# Patient Record
Sex: FEMALE | Race: WHITE | NOT HISPANIC OR LATINO | Employment: UNEMPLOYED | URBAN - METROPOLITAN AREA
[De-identification: names, ages, dates, MRNs, and addresses within clinical notes are randomized per-mention and may not be internally consistent; named-entity substitution may affect disease eponyms.]

---

## 2017-07-03 ENCOUNTER — ALLSCRIPTS OFFICE VISIT (OUTPATIENT)
Dept: OTHER | Facility: OTHER | Age: 22
End: 2017-07-03

## 2017-07-03 DIAGNOSIS — F19.20 OTHER PSYCHOACTIVE SUBSTANCE DEPENDENCE, UNCOMPLICATED (HCC): ICD-10-CM

## 2017-07-05 ENCOUNTER — APPOINTMENT (OUTPATIENT)
Dept: LAB | Facility: HOSPITAL | Age: 22
End: 2017-07-05
Payer: COMMERCIAL

## 2017-07-05 DIAGNOSIS — F19.20 OTHER PSYCHOACTIVE SUBSTANCE DEPENDENCE, UNCOMPLICATED (HCC): ICD-10-CM

## 2017-07-05 PROCEDURE — 80307 DRUG TEST PRSMV CHEM ANLYZR: CPT

## 2017-07-10 ENCOUNTER — ALLSCRIPTS OFFICE VISIT (OUTPATIENT)
Dept: OTHER | Facility: OTHER | Age: 22
End: 2017-07-10

## 2017-07-10 LAB
BUPRENORPHINE UR CFM-MCNC: 48 NG/ML
BUPRENORPHINE UR QL CFM: NORMAL NG/ML
BUPRENORPHINE UR QL CFM: POSITIVE
BUPRENORPHINE+NOR UR QL: POSITIVE
NORBUPRENORPHINE UR CFM-MCNC: 136 NG/ML
NORBUPRENORPHINE UR QL CFM: POSITIVE

## 2017-07-11 ENCOUNTER — APPOINTMENT (OUTPATIENT)
Dept: LAB | Facility: HOSPITAL | Age: 22
End: 2017-07-11
Payer: COMMERCIAL

## 2017-07-11 DIAGNOSIS — F19.20 OTHER PSYCHOACTIVE SUBSTANCE DEPENDENCE, UNCOMPLICATED (HCC): ICD-10-CM

## 2017-07-13 LAB
AMPHETAMINES UR QL SCN: NEGATIVE NG/ML
BARBITURATES UR QL SCN: NEGATIVE NG/ML
BENZODIAZ UR QL SCN: NEGATIVE
BZE UR QL SCN: POSITIVE
CANNABINOIDS UR QL SCN: POSITIVE
METHADONE UR QL SCN: NEGATIVE NG/ML
OPIATES UR QL: POSITIVE
PCP UR QL: NEGATIVE NG/ML
PROPOXYPH UR QL: NEGATIVE NG/ML

## 2017-07-20 LAB
BUPRENORPHINE UR CFM-MCNC: 278 NG/ML
BUPRENORPHINE UR QL CFM: NORMAL NG/ML
BUPRENORPHINE UR QL CFM: POSITIVE
BUPRENORPHINE+NOR UR QL: POSITIVE
NORBUPRENORPHINE UR CFM-MCNC: 1060 NG/ML
NORBUPRENORPHINE UR QL CFM: POSITIVE

## 2017-07-24 LAB
AMPHETAMINES UR QL SCN: POSITIVE
BARBITURATES UR QL SCN: NEGATIVE NG/ML
BENZODIAZ UR QL SCN: NEGATIVE NG/ML
BZE UR QL SCN: NEGATIVE NG/ML
CANNABINOIDS UR QL SCN: POSITIVE
METHADONE UR QL SCN: NEGATIVE NG/ML
OPIATES UR QL: POSITIVE
PCP UR QL: NEGATIVE NG/ML
PROPOXYPH UR QL: NEGATIVE NG/ML

## 2017-08-01 LAB — AMPHETAMINES UR QL SCN: NORMAL

## 2017-08-07 ENCOUNTER — ALLSCRIPTS OFFICE VISIT (OUTPATIENT)
Dept: OTHER | Facility: OTHER | Age: 22
End: 2017-08-07

## 2017-08-18 ENCOUNTER — OFFICE VISIT (OUTPATIENT)
Dept: URGENT CARE | Facility: CLINIC | Age: 22
End: 2017-08-18
Payer: COMMERCIAL

## 2017-08-18 PROCEDURE — 99203 OFFICE O/P NEW LOW 30 MIN: CPT

## 2018-01-12 VITALS
BODY MASS INDEX: 19.46 KG/M2 | HEIGHT: 64 IN | SYSTOLIC BLOOD PRESSURE: 98 MMHG | WEIGHT: 114 LBS | RESPIRATION RATE: 16 BRPM | DIASTOLIC BLOOD PRESSURE: 60 MMHG | HEART RATE: 84 BPM | TEMPERATURE: 98.3 F

## 2018-01-13 VITALS
HEIGHT: 64 IN | DIASTOLIC BLOOD PRESSURE: 60 MMHG | RESPIRATION RATE: 16 BRPM | WEIGHT: 116 LBS | HEART RATE: 68 BPM | BODY MASS INDEX: 19.81 KG/M2 | SYSTOLIC BLOOD PRESSURE: 100 MMHG

## 2018-01-13 NOTE — MISCELLANEOUS
Provider Comments  Provider Comments:   NO SHOW APPT      Signatures   Electronically signed by : Lor Tavera, ; Aug  7 2017  3:05PM EST                       (Author)

## 2018-11-06 ENCOUNTER — OFFICE VISIT (OUTPATIENT)
Dept: URGENT CARE | Facility: CLINIC | Age: 23
End: 2018-11-06
Payer: COMMERCIAL

## 2018-11-06 VITALS
BODY MASS INDEX: 24.75 KG/M2 | SYSTOLIC BLOOD PRESSURE: 110 MMHG | RESPIRATION RATE: 18 BRPM | TEMPERATURE: 97.7 F | OXYGEN SATURATION: 96 % | DIASTOLIC BLOOD PRESSURE: 71 MMHG | HEIGHT: 64 IN | HEART RATE: 80 BPM | WEIGHT: 145 LBS

## 2018-11-06 DIAGNOSIS — R11.0 NAUSEA: Primary | ICD-10-CM

## 2018-11-06 PROCEDURE — 99213 OFFICE O/P EST LOW 20 MIN: CPT | Performed by: FAMILY MEDICINE

## 2018-11-06 RX ORDER — QUETIAPINE FUMARATE 200 MG/1
25 TABLET, FILM COATED ORAL
COMMUNITY

## 2018-11-06 RX ORDER — BUPRENORPHINE HYDROCHLORIDE AND NALOXONE HYDROCHLORIDE DIHYDRATE 8; 2 MG/1; MG/1
1 TABLET SUBLINGUAL DAILY
COMMUNITY
End: 2022-04-13

## 2018-11-06 RX ORDER — ONDANSETRON 4 MG/1
4 TABLET, ORALLY DISINTEGRATING ORAL EVERY 6 HOURS PRN
Qty: 12 TABLET | Refills: 0 | Status: SHIPPED | OUTPATIENT
Start: 2018-11-06

## 2018-11-06 RX ORDER — ONDANSETRON 4 MG/1
4 TABLET, ORALLY DISINTEGRATING ORAL ONCE
Status: COMPLETED | OUTPATIENT
Start: 2018-11-06 | End: 2018-11-06

## 2018-11-06 RX ORDER — GABAPENTIN 300 MG/1
100 CAPSULE ORAL 3 TIMES DAILY
COMMUNITY

## 2018-11-06 RX ADMIN — ONDANSETRON 4 MG: 4 TABLET, ORALLY DISINTEGRATING ORAL at 18:39

## 2018-11-06 NOTE — PROGRESS NOTES
[facility]        NAME: Rogerio Marquez is a 24 y o  female  : 1995    MRN: [id number]  DATE: 2018  TIME: 7:01 PM    Assessment and Plan   Nausea [R11 0]  1  Nausea  ondansetron (ZOFRAN-ODT) dispersible tablet 4 mg    ondansetron (ZOFRAN-ODT) 4 mg disintegrating tablet         Patient Instructions     For completeness sake, and because you were unable to urinate here in the office, I would recommend following up with a home urine pregnancy test   Nausea and vomiting could just be due to the rehab process/lack of Seroquel  I would recommend restarting the Seroquel  If no improvement in the next day or 2 follow up with the emergency room for further evaluation  Follow up with PCP in 3-5 days  Proceed to  ER if symptoms worsen  Chief Complaint     Chief Complaint   Patient presents with    Vomiting     vomiting for 3-4 days, denies belly pain         History of Present Illness       Patient just got out of rehab in the past week  She has been without her Seroquel since that time, and was just sent to the pharmacy today  She did start with nausea and vomiting 3 days ago  Not tolerating any solids, she is tolerating water, to some degree  She tried Gatorade earlier today and it did make her vomit  A psychiatrist does not think that being without her Seroquel could be causing the nausea and vomiting  Patient denies any chance that she could be pregnant  Vomiting    Pertinent negatives include no chills or fever  Review of Systems   Review of Systems   Constitutional: Positive for appetite change  Negative for chills and fever  HENT: Negative  Respiratory: Negative  Cardiovascular: Negative  Gastrointestinal: Positive for vomiting           Current Medications       Current Outpatient Prescriptions:     buprenorphine-naloxone (SUBOXONE) 8-2 mg per SL tablet, Place 1 tablet under the tongue daily, Disp: , Rfl:     gabapentin (NEURONTIN) 300 mg capsule, Take 100 mg by mouth 3 (three) times a day, Disp: , Rfl:     QUEtiapine (SEROquel) 200 mg tablet, Take 25 mg by mouth daily at bedtime, Disp: , Rfl:     ondansetron (ZOFRAN-ODT) 4 mg disintegrating tablet, Take 1 tablet (4 mg total) by mouth every 6 (six) hours as needed for nausea or vomiting, Disp: 12 tablet, Rfl: 0  No current facility-administered medications for this visit  Current Allergies     Allergies as of 11/06/2018    (No Known Allergies)            The following portions of the patient's history were reviewed and updated as appropriate: allergies, current medications, past family history, past medical history, past social history, past surgical history and problem list      Past Medical History:   Diagnosis Date    Addiction to drug ([facility] [address] )     Anxiety        History reviewed  No pertinent surgical history  History reviewed  No pertinent family history  Medications have been verified  Objective   /71   Pulse 80   Temp 97 7 °F (36 5 °C) (Tympanic)   Resp 18   Ht 5' 4" (1 626 m)   Wt 65 8 kg (145 lb)   LMP 11/03/2018   SpO2 96%   BMI 24 89 kg/m²        Physical Exam     Physical Exam   Constitutional: She is oriented to person, place, and time  She appears well-developed and well-nourished  HENT:   Right Ear: External ear normal    Left Ear: External ear normal    Nose: Nose normal    Mouth/Throat: Oropharynx is clear and moist  No oropharyngeal exudate  Eyes: Conjunctivae are normal    Neck: Normal range of motion  Neck supple  Cardiovascular: Normal rate, regular rhythm and normal heart sounds  No murmur heard  Pulmonary/Chest: Effort normal and breath sounds normal  No respiratory distress  She has no wheezes  She has no rales  She exhibits no tenderness  Abdominal: Soft  Bowel sounds are normal  There is no tenderness  Musculoskeletal: Normal range of motion  Lymphadenopathy:     She has no cervical adenopathy     Neurological: She is alert and oriented to person, place, and time  Skin: Skin is warm  No rash noted  No erythema

## 2018-11-07 NOTE — PATIENT INSTRUCTIONS
For completeness sake, and because you were unable to urinate here in the office, I would recommend following up with a home urine pregnancy test   Nausea and vomiting could just be due to the rehab process/lack of Seroquel  I would recommend restarting the Seroquel  If no improvement in the next day or 2 follow up with the emergency room for further evaluation  Follow up with PCP in 3-5 days  Proceed to  ER if symptoms worsen  Acute Nausea and Vomiting   AMBULATORY CARE:   Acute nausea and vomiting  starts suddenly, gets worse quickly, and lasts a short time  Common causes include pregnancy, alcohol, infection, and medicines  A head injury, heart attack, or inner ear imbalance can also cause acute nausea and vomiting  Seek care immediately if:   · You see blood in your vomit or your bowel movements  · You have sudden, severe pain in your chest and upper abdomen after hard vomiting or retching  · You have swelling in your neck and chest      · You are dizzy, cold, and thirsty and your eyes and mouth are dry  · You are urinating very little or not at all  · You have muscle weakness, leg cramps, and trouble breathing  · Your heart is beating much faster than normal      · You continue to vomit for more than 48 hours  Contact your healthcare provider if:   · You have frequent dry heaves (vomiting but nothing comes out)  · Your nausea and vomiting does not get better or go away after you use medicine  · You have questions or concerns about your condition or treatment  Treatment for acute nausea and vomiting  may include medicines to calm your stomach and stop the vomiting  You may need IV fluids if you are dehydrated  Prevent or manage acute nausea and vomiting:   · Do not drink alcohol  Alcohol may upset or irritate your stomach  Too much alcohol can also cause acute nausea and vomiting  · Control stress  Headaches due to stress may cause nausea and vomiting   Find ways to relax and manage your stress  Get more rest and sleep  · Drink more liquids as directed  Vomiting can lead to dehydration  It is important to drink more liquids to help replace lost body fluids  Ask your healthcare provider how much liquid to drink each day and which liquids are best for you  Your provider may recommend that you drink an oral rehydration solution (ORS)  ORS contains water, salts, and sugar that are needed to replace the lost body fluids  Ask what kind of ORS to use, how much to drink, and where to get it  · Eat smaller meals, more often  Eat small amounts of food every 2 to 3 hours, even if you are not hungry  Food in your stomach may decrease your nausea  · Talk to your healthcare provider before you take over-the-counter (OTC) medicines  These medicines can cause serious problems if you use certain other medicines, or you have a medical condition  You may have problems if you use too much or use them for longer than the label says  Follow directions on the label carefully  Follow up with your healthcare provider as directed:  Write down your questions so you remember to ask them during your visits  © 2017 [address] [employer] Information is for End User's use only and may not be sold, redistributed or otherwise used for commercial purposes  All illustrations and images included in CareNotes® are the copyrighted property of [employer]  or [employer]  The above information is an  only  It is not intended as medical advice for individual conditions or treatments  Talk to your doctor, nurse or pharmacist before following any medical regimen to see if it is safe and effective for you

## 2020-11-23 ENCOUNTER — APPOINTMENT (EMERGENCY)
Dept: CT IMAGING | Facility: HOSPITAL | Age: 25
End: 2020-11-23
Payer: COMMERCIAL

## 2020-11-23 ENCOUNTER — HOSPITAL ENCOUNTER (EMERGENCY)
Facility: HOSPITAL | Age: 25
Discharge: HOME | End: 2020-11-23
Attending: EMERGENCY MEDICINE | Admitting: EMERGENCY MEDICINE
Payer: COMMERCIAL

## 2020-11-23 VITALS
TEMPERATURE: 98.3 F | OXYGEN SATURATION: 99 % | RESPIRATION RATE: 16 BRPM | HEART RATE: 91 BPM | DIASTOLIC BLOOD PRESSURE: 53 MMHG | SYSTOLIC BLOOD PRESSURE: 106 MMHG

## 2020-11-23 DIAGNOSIS — N94.9 ADNEXAL CYST: ICD-10-CM

## 2020-11-23 DIAGNOSIS — N12 PYELONEPHRITIS: Primary | ICD-10-CM

## 2020-11-23 LAB
ALBUMIN SERPL BCP-MCNC: 3.6 G/DL (ref 3.5–5.7)
ALP SERPL-CCNC: 56 U/L (ref 40–150)
ALT SERPL W P-5'-P-CCNC: 6 U/L (ref 7–52)
ANION GAP SERPL CALCULATED.3IONS-SCNC: 10 MMOL/L (ref 4–13)
AST SERPL W P-5'-P-CCNC: 13 U/L (ref 13–39)
BACTERIA UR QL AUTO: ABNORMAL /HPF
BASOPHILS # BLD AUTO: 0.1 THOUSANDS/ΜL (ref 0–0.1)
BASOPHILS NFR BLD AUTO: 0 % (ref 0–2)
BILIRUB SERPL-MCNC: 0.6 MG/DL (ref 0.2–1)
BILIRUB UR QL STRIP: NEGATIVE
BUN SERPL-MCNC: 14 MG/DL (ref 7–25)
CALCIUM SERPL-MCNC: 8.2 MG/DL (ref 8.6–10.5)
CHLORIDE SERPL-SCNC: 102 MMOL/L (ref 98–107)
CLARITY UR: ABNORMAL
CO2 SERPL-SCNC: 24 MMOL/L (ref 21–31)
COLOR UR: YELLOW
CREAT SERPL-MCNC: 0.79 MG/DL (ref 0.6–1.2)
EOSINOPHIL # BLD AUTO: 0 THOUSAND/ΜL (ref 0–0.61)
EOSINOPHIL NFR BLD AUTO: 0 % (ref 0–5)
ERYTHROCYTE [DISTWIDTH] IN BLOOD BY AUTOMATED COUNT: 13.5 % (ref 11.5–14.5)
EXT PREG TEST URINE: NEGATIVE
EXT. CONTROL ED NAV: NORMAL
GFR SERPL CREATININE-BSD FRML MDRD: 104 ML/MIN/1.73SQ M
GLUCOSE SERPL-MCNC: 103 MG/DL (ref 65–99)
GLUCOSE UR STRIP-MCNC: NEGATIVE MG/DL
HCT VFR BLD AUTO: 43.8 % (ref 42–47)
HGB BLD-MCNC: 14.5 G/DL (ref 12–16)
HGB UR QL STRIP.AUTO: ABNORMAL
KETONES UR STRIP-MCNC: ABNORMAL MG/DL
LACTATE SERPL-SCNC: 0.6 MMOL/L (ref 0.5–2)
LEUKOCYTE ESTERASE UR QL STRIP: ABNORMAL
LIPASE SERPL-CCNC: <10 U/L (ref 11–82)
LYMPHOCYTES # BLD AUTO: 0.7 THOUSANDS/ΜL (ref 0.6–4.47)
LYMPHOCYTES NFR BLD AUTO: 4 % (ref 21–51)
MCH RBC QN AUTO: 33.8 PG (ref 26–34)
MCHC RBC AUTO-ENTMCNC: 33.1 G/DL (ref 31–37)
MCV RBC AUTO: 102 FL (ref 81–99)
MONOCYTES # BLD AUTO: 1.8 THOUSAND/ΜL (ref 0.17–1.22)
MONOCYTES NFR BLD AUTO: 10 % (ref 2–12)
NEUTROPHILS # BLD AUTO: 15.9 THOUSANDS/ΜL (ref 1.4–6.5)
NEUTS SEG NFR BLD AUTO: 86 % (ref 42–75)
NITRITE UR QL STRIP: POSITIVE
NON-SQ EPI CELLS URNS QL MICRO: ABNORMAL /HPF
PH UR STRIP.AUTO: 6 [PH]
PLATELET # BLD AUTO: 247 THOUSANDS/UL (ref 149–390)
PLATELET BLD QL SMEAR: ADEQUATE
PLATELET CLUMP BLD QL SMEAR: NORMAL
PMV BLD AUTO: 9.5 FL (ref 8.6–11.7)
POTASSIUM SERPL-SCNC: 3.8 MMOL/L (ref 3.5–5.5)
PROT SERPL-MCNC: 6.3 G/DL (ref 6.4–8.9)
PROT UR STRIP-MCNC: ABNORMAL MG/DL
RBC # BLD AUTO: 4.28 MILLION/UL (ref 3.9–5.2)
RBC #/AREA URNS AUTO: ABNORMAL /HPF
RBC MORPH BLD: NORMAL
SODIUM SERPL-SCNC: 136 MMOL/L (ref 134–143)
SP GR UR STRIP.AUTO: 1.02 (ref 1–1.03)
UROBILINOGEN UR QL STRIP.AUTO: 0.2 E.U./DL
WBC # BLD AUTO: 18.5 THOUSAND/UL (ref 4.8–10.8)
WBC #/AREA URNS AUTO: ABNORMAL /HPF

## 2020-11-23 PROCEDURE — 87086 URINE CULTURE/COLONY COUNT: CPT | Performed by: EMERGENCY MEDICINE

## 2020-11-23 PROCEDURE — 83605 ASSAY OF LACTIC ACID: CPT | Performed by: PHYSICIAN ASSISTANT

## 2020-11-23 PROCEDURE — 85025 COMPLETE CBC W/AUTO DIFF WBC: CPT | Performed by: PHYSICIAN ASSISTANT

## 2020-11-23 PROCEDURE — G1004 CDSM NDSC: HCPCS

## 2020-11-23 PROCEDURE — 96375 TX/PRO/DX INJ NEW DRUG ADDON: CPT

## 2020-11-23 PROCEDURE — 74176 CT ABD & PELVIS W/O CONTRAST: CPT

## 2020-11-23 PROCEDURE — 99284 EMERGENCY DEPT VISIT MOD MDM: CPT

## 2020-11-23 PROCEDURE — 83690 ASSAY OF LIPASE: CPT | Performed by: PHYSICIAN ASSISTANT

## 2020-11-23 PROCEDURE — 81001 URINALYSIS AUTO W/SCOPE: CPT | Performed by: EMERGENCY MEDICINE

## 2020-11-23 PROCEDURE — 87186 SC STD MICRODIL/AGAR DIL: CPT | Performed by: EMERGENCY MEDICINE

## 2020-11-23 PROCEDURE — 96365 THER/PROPH/DIAG IV INF INIT: CPT

## 2020-11-23 PROCEDURE — 80053 COMPREHEN METABOLIC PANEL: CPT | Performed by: PHYSICIAN ASSISTANT

## 2020-11-23 PROCEDURE — 81025 URINE PREGNANCY TEST: CPT | Performed by: EMERGENCY MEDICINE

## 2020-11-23 PROCEDURE — 87040 BLOOD CULTURE FOR BACTERIA: CPT | Performed by: PHYSICIAN ASSISTANT

## 2020-11-23 PROCEDURE — 36415 COLL VENOUS BLD VENIPUNCTURE: CPT | Performed by: PHYSICIAN ASSISTANT

## 2020-11-23 PROCEDURE — 87077 CULTURE AEROBIC IDENTIFY: CPT | Performed by: EMERGENCY MEDICINE

## 2020-11-23 PROCEDURE — 99284 EMERGENCY DEPT VISIT MOD MDM: CPT | Performed by: PHYSICIAN ASSISTANT

## 2020-11-23 RX ORDER — SULFAMETHOXAZOLE AND TRIMETHOPRIM 800; 160 MG/1; MG/1
1 TABLET ORAL 2 TIMES DAILY
Qty: 14 TABLET | Refills: 0 | Status: SHIPPED | OUTPATIENT
Start: 2020-11-23 | End: 2020-11-23 | Stop reason: SDUPTHER

## 2020-11-23 RX ORDER — KETOROLAC TROMETHAMINE 30 MG/ML
30 INJECTION, SOLUTION INTRAMUSCULAR; INTRAVENOUS ONCE
Status: COMPLETED | OUTPATIENT
Start: 2020-11-23 | End: 2020-11-23

## 2020-11-23 RX ORDER — CEFTRIAXONE 1 G/50ML
1000 INJECTION, SOLUTION INTRAVENOUS ONCE
Status: COMPLETED | OUTPATIENT
Start: 2020-11-23 | End: 2020-11-23

## 2020-11-23 RX ORDER — SULFAMETHOXAZOLE AND TRIMETHOPRIM 800; 160 MG/1; MG/1
1 TABLET ORAL 2 TIMES DAILY
Qty: 28 TABLET | Refills: 0 | Status: SHIPPED | OUTPATIENT
Start: 2020-11-23 | End: 2020-12-07

## 2020-11-23 RX ADMIN — CEFTRIAXONE 1000 MG: 1 INJECTION, SOLUTION INTRAVENOUS at 14:34

## 2020-11-23 RX ADMIN — SODIUM CHLORIDE 1000 ML: 0.9 INJECTION, SOLUTION INTRAVENOUS at 14:33

## 2020-11-23 RX ADMIN — KETOROLAC TROMETHAMINE 30 MG: 30 INJECTION, SOLUTION INTRAMUSCULAR at 14:33

## 2020-11-25 LAB — BACTERIA UR CULT: ABNORMAL

## 2020-11-25 RX ORDER — LEVOFLOXACIN 750 MG/1
750 TABLET ORAL DAILY
Qty: 5 TABLET | Refills: 0 | Status: SHIPPED | OUTPATIENT
Start: 2020-11-25 | End: 2020-11-30

## 2020-11-29 LAB
BACTERIA BLD CULT: NORMAL
BACTERIA BLD CULT: NORMAL

## 2022-04-13 ENCOUNTER — OFFICE VISIT (OUTPATIENT)
Dept: INTERNAL MEDICINE CLINIC | Facility: CLINIC | Age: 27
End: 2022-04-13

## 2022-04-13 VITALS
DIASTOLIC BLOOD PRESSURE: 64 MMHG | BODY MASS INDEX: 19.72 KG/M2 | OXYGEN SATURATION: 98 % | TEMPERATURE: 97.2 F | WEIGHT: 115.5 LBS | HEART RATE: 76 BPM | SYSTOLIC BLOOD PRESSURE: 108 MMHG | HEIGHT: 64 IN

## 2022-04-13 DIAGNOSIS — F10.10 ALCOHOL ABUSE: ICD-10-CM

## 2022-04-13 DIAGNOSIS — Z79.899 ENCOUNTER FOR MONITORING SUBOXONE MAINTENANCE THERAPY: ICD-10-CM

## 2022-04-13 DIAGNOSIS — Z79.899 MEDICATION THERAPY CONTINUED: ICD-10-CM

## 2022-04-13 DIAGNOSIS — Z51.81 ENCOUNTER FOR MONITORING SUBOXONE MAINTENANCE THERAPY: ICD-10-CM

## 2022-04-13 DIAGNOSIS — F11.20 HEROIN USE DISORDER, MODERATE, DEPENDENCE (HCC): Primary | ICD-10-CM

## 2022-04-13 PROBLEM — G47.00 INSOMNIA: Status: ACTIVE | Noted: 2017-07-03

## 2022-04-13 PROBLEM — F41.9 ANXIETY: Status: ACTIVE | Noted: 2017-07-03

## 2022-04-13 PROBLEM — F17.200 TOBACCO USE DISORDER: Status: ACTIVE | Noted: 2017-07-03

## 2022-04-13 PROBLEM — Z72.0 TOBACCO ABUSE: Status: ACTIVE | Noted: 2017-07-03

## 2022-04-13 PROCEDURE — 99203 OFFICE O/P NEW LOW 30 MIN: CPT | Performed by: INTERNAL MEDICINE

## 2022-04-13 PROCEDURE — 80307 DRUG TEST PRSMV CHEM ANLYZR: CPT | Performed by: INTERNAL MEDICINE

## 2022-04-13 RX ORDER — BUPRENORPHINE AND NALOXONE 8; 2 MG/1; MG/1
FILM, SOLUBLE BUCCAL; SUBLINGUAL
Qty: 14 FILM | Refills: 0 | Status: CANCELLED | OUTPATIENT
Start: 2022-04-13

## 2022-04-13 RX ORDER — BUPRENORPHINE AND NALOXONE 8; 2 MG/1; MG/1
FILM, SOLUBLE BUCCAL; SUBLINGUAL
Qty: 14 FILM | Refills: 0 | Status: SHIPPED | OUTPATIENT
Start: 2022-04-13

## 2022-04-13 RX ORDER — NALOXONE HYDROCHLORIDE 4 MG/.1ML
1 SPRAY NASAL AS NEEDED
Qty: 1 EACH | Refills: 1 | Status: SHIPPED | OUTPATIENT
Start: 2022-04-13

## 2022-04-13 NOTE — PATIENT INSTRUCTIONS
Naloxone (Into the nose)   Naloxone Hydrochloride (nal-OX-one hernandez-droe-KLOR-chencho)  Treats opioid overdose in an emergency situation  Must be given as soon as possible  Brand Name(s): Kloxxado, Narcan   There may be other brand names for this medicine  When This Medicine Should Not Be Used: This medicine is not right for everyone  Do not use it if you had an allergic reaction to naloxone  How to Use This Medicine:   Spray  · Your doctor will tell you how much medicine to use  Do not use more than directed  · Your doctor or a pharmacist can tell you where to buy this medicine  It is available without a doctor's order at most major pharmacies  · This medicine must be given to you (the patient) by someone else  Talk with people close to you so they know what to do in case of an emergency  · Read and follow the patient instructions that come with this medicine  Talk to your doctor or pharmacist if you have any questions  · This medicine is for use only in the nose  Do not get any of it in your eyes or on your skin  If it does get on these areas, rinse it off right away  · To use:   ? Each nasal spray contains only one dose of naloxone  Do not prime or test the nasal spray  ? Hold the nasal spray with your thumb on the bottom of the plunger and your first and middle fingers on either side of the nozzle  ? Christine Bench the patient on his or her back  Support the patient's neck with your hand and let the head tilt back  ? Gently insert the tip of the nozzle into one nostril, until your fingers on either side of the nozzle are against the bottom of the patient's nose  ? Press the plunger firmly to give the dose  Remove the nasal spray from the patient's nose  ? Move the patient on his or her side (recovery position)  Get emergency medical help right away  ? Watch the patient closely  If needed, you may give more doses every 2 to 3 minutes until the patient responds   Use a new nasal spray for each dose and spray the medicine into the other nostril each time  · Store the medicine in a closed container at room temperature, away from heat, moisture, and direct light  Do not freeze or expose to excessive heat  If the spray is frozen and is needed in an emergency, do not wait for the spray to thaw  Get medical help right away  However, the spray may be thawed for 15 minutes in room temperature, and it can still be used if it has been thawed after being frozen before  · Ask your pharmacist about the best way to dispose of medicine that you do not use  Drugs and Foods to Avoid:      Ask your doctor or pharmacist before using any other medicine, including over-the-counter medicines, vitamins, and herbal products  Warnings While Using This Medicine:   · Tell your doctor if you are pregnant or breastfeeding, or if you have heart or blood vessel disease  · This medicine should be given right away after a suspected or known overdose of an opioid (narcotic) medicine  This will help prevent serious breathing problems and severe sleepiness that can lead to death  · The effects of the opioid medicine may last longer than the effects of the naloxone  This means the breathing problems and sleepiness could come back  Always call for emergency help after the first dose of naloxone  · This medicine could cause withdrawal symptoms from the opioid medicine  · Keep all medicine out of the reach of children  Never share your medicine with anyone    Possible Side Effects While Using This Medicine:   Call your doctor right away if you notice any of these side effects:  · Allergic reaction: Itching or hives, swelling in your face or hands, swelling or tingling in your mouth or throat, chest tightness, trouble breathing  · Crying more than usual (in babies)  · Diarrhea, nausea, vomiting, stomach cramps or pain  · Fast, pounding, or uneven heartbeat  · Fever, runny nose, sneezing, sweating, yawning, discomfort in the nose  · Lightheadedness, dizziness, fainting  · Ongoing trouble breathing  · Seizure, shaking, or feeling restless, nervous, or irritable  · Unusual tiredness or weakness  If you notice these less serious side effects, talk with your doctor:   · Headache  · Joint or muscle pain  If you notice other side effects that you think are caused by this medicine, tell your doctor  Call your doctor for medical advice about side effects  You may report side effects to FDA at 2-242-PXV-9661    © Copyright [employer] 2022 Information is for End User's use only and may not be sold, redistributed or otherwise used for commercial purposes  The above information is an  only  It is not intended as medical advice for individual conditions or treatments  Talk to your doctor, nurse or pharmacist before following any medical regimen to see if it is safe and effective for you  Buprenorphine/Naloxone (Into the mouth)   Buprenorphine (bue-pre-NOR-feen), Naloxone (nal-OX-one)  Treats narcotic dependence  Brand Name(s): Suboxone, Zubsolv   There may be other brand names for this medicine  When This Medicine Should Not Be Used: This medicine is not right for everyone  Do not use it if you had an allergic reaction to buprenorphine or naloxone  How to Use This Medicine: Thin Sheet, Tablet  · Take your medicine as directed  Your dose may need to be changed several times to find what works best for you  · You must let the medicine dissolve  Never swallow the film or tablet  Your body may not absorb enough of the medicine if you swallow it  · Your health caregiver should show you how to use the medicine  If you do not understand, ask for help  It is important to use the medicine correctly  · Do not talk while the medicine is inside your mouth  · Buccal film: Rinse your mouth with water to moisten it  Place the film against the inside of your cheek   If your doctor told you to use more than 1 film, place the second film inside your other cheek  Do not place more than 2 films inside of 1 cheek at a time  Do not move or touch the film  Do not eat or drink anything until the film is completely dissolved  · Sublingual tablet: Place the tablet under your tongue  If your doctor told you to use more than 1 tablet, place all of the tablets in different places under your tongue at the same time  You can use 2 tablets at a time until you have taken all of the medicine, if that is easier for you  Let the tablets dissolve completely in your mouth  Do not eat or drink anything until the tablets are completely dissolved  · Sublingual film: Drink some water to help moisten your mouth  Place the film under your tongue  If your doctor told you to use more than 1 film, place the second film on the opposite side from the first one  Do not move the film after you placed it under your tongue  If you are supposed to use more than 2 films, use them the same way, but do not start until the first 2 films are completely dissolved  Do not eat or drink anything until the film is completely dissolved  · Do not break, crush, chew, or cut the film or tablet  · This medicine should come with a Medication Guide  Ask your pharmacist for a copy if you do not have one  · Missed dose: Take a dose as soon as you remember  If it is almost time for your next dose, wait until then and take a regular dose  Do not take extra medicine to make up for a missed dose  · Store the medicine in a closed container at room temperature, away from heat, moisture, and direct light  Drop off any unused narcotic medicine at a drug take-back location right away  If you do not have a drug take-back location near you, flush any unused narcotic medicine down the toilet  Check your local drug store and clinics for take-back locations  You can also check the [employer] web site for locations   Here is the link to the FDA safe disposal of medicines [employer]  Drugs and Foods to Avoid:   Ask your doctor or pharmacist before using any other medicine, including over-the-counter medicines, vitamins, and herbal products  · Do not use this medicine if you are using or have used an MAO inhibitor within the past 14 days  · Some medicines can affect how buprenorphine/naloxone works  Tell your doctor if you are using the following:   ? Carbamazepine, cyclobenzaprine, erythromycin, ketoconazole, metaxalone, mirtazapine, phenobarbital, phenytoin, rifampin, tramadol, trazodone  ? Diuretic (water pill)  ? Medicine to treat depression or mental health problems (including SNRIs, SSRIs, TCAs)  ? Medicine to treat HIV/AIDS (including atazanavir, delavirdine, efavirenz, etravirine, nevirapine, ritonavir)  ? Phenothiazine medicine  ? Triptan medicine to treat migraine headaches  · Do not drink alcohol while you are using this medicine  · Tell your doctor if you use anything else that makes you sleepy  Some examples are allergy medicine, narcotic pain medicine, and alcohol  Tell your doctor if you are also using butorphanol, nalbuphine, pentazocine, or a muscle relaxer  Warnings While Using This Medicine:   · Tell your doctor if you are pregnant or breastfeeding, or if you have kidney disease, liver disease (including hepatitis), lung or breathing problems (including sleep apnea), adrenal gland problems, an enlarged prostate, trouble urinating, gallbladder problems, thyroid problems, stomach problems, or a history of depression, brain tumor, head injury, or alcohol or drug abuse  · This medicine may cause the following problems:  ? High risk of overdose, which can lead to death  ? Respiratory depression (serious breathing problem that can be life-threatening)  ? Adrenal gland problems  ?  Sleep-related breathing problems (including sleep apnea, sleep-related hypoxemia)  ? Low blood pressure  ? Liver problems  ? Serotonin syndrome, when used with certain medicines  · This medicine may make you dizzy or drowsy  Do not drive or do anything else that could be dangerous until you know how this medicine affects you  Sit or lie down if you feel dizzy  Stand up carefully  · Tell any doctor or dentist who treats you that you are using this medicine  · This medicine can be habit-forming  Do not use more than your prescribed dose  Call your doctor if you think your medicine is not working  · This medicine may cause constipation, especially with long-term use  Ask your doctor if you should use a laxative to prevent and treat constipation  · Do not stop using this medicine suddenly  Your doctor will need to slowly decrease your dose before you stop it completely  · This medicine could cause infertility  Talk with your doctor before using this medicine if you plan to have children  · Your doctor will do lab tests at regular visits to check on the effects of this medicine  Keep all appointments  · Keep all medicine out of the reach of children  Never share your medicine with anyone    Possible Side Effects While Using This Medicine:   Call your doctor right away if you notice any of these side effects:  · Allergic reaction: Itching or hives, swelling in your face or hands, swelling or tingling in your mouth or throat, chest tightness, trouble breathing  · Blue lips, fingernails, or skin, trouble breathing  · Changes in skin color, dark freckles, cold feeling, tiredness, weight loss  · Dark urine or pale stools, nausea, vomiting, loss of appetite, stomach pain, yellow skin or eyes  · Extreme dizziness, drowsiness, or weakness, slow heartbeat, sweating, seizures, cold or clammy skin  · Severe confusion, lightheadedness, dizziness, or fainting  If you notice these less serious side effects, talk with your doctor:   · Constipation, diarrhea, nausea, vomiting, stomach upset  · Headache  · Stuffy or runny nose  If you notice other side effects that you think are caused by this medicine, tell your doctor  Call your doctor for medical advice about side effects  You may report side effects to FDA at 7-353-FDA-0134    © Copyright [employer] 2022 Information is for End User's use only and may not be sold, redistributed or otherwise used for commercial purposes  The above information is an  only  It is not intended as medical advice for individual conditions or treatments  Talk to your doctor, nurse or pharmacist before following any medical regimen to see if it is safe and effective for you

## 2022-04-13 NOTE — PROGRESS NOTES
Assessment/Plan:  Problem List Items Addressed This Visit        Other    Medication therapy continued    Relevant Medications    naloxone (Narcan) 4 mg/0 1 mL nasal spray    buprenorphine-naloxone (Suboxone) 8-2 mg    Other Relevant Orders    Suboxone    Toxicology screen, urine    Heroin use disorder, moderate, dependence (HCC) - Primary    Relevant Medications    naloxone (Narcan) 4 mg/0 1 mL nasal spray    buprenorphine-naloxone (Suboxone) 8-2 mg    Other Relevant Orders    Suboxone    Toxicology screen, urine    Alcohol abuse      Other Visit Diagnoses     Encounter for monitoring Suboxone maintenance therapy        Relevant Medications    naloxone (Narcan) 4 mg/0 1 mL nasal spray    buprenorphine-naloxone (Suboxone) 8-2 mg    Other Relevant Orders    Suboxone    Toxicology screen, urine           Diagnoses and all orders for this visit:    Heroin use disorder, moderate, dependence (HCC)  -     naloxone (Narcan) 4 mg/0 1 mL nasal spray; 0 1 mL (4 mg total) into each nostril as needed (respiratory depression or possible OD)  -     Suboxone  -     Toxicology screen, urine  -     buprenorphine-naloxone (Suboxone) 8-2 mg; One or two strips sl q day  Encounter for monitoring Suboxone maintenance therapy  -     naloxone (Narcan) 4 mg/0 1 mL nasal spray; 0 1 mL (4 mg total) into each nostril as needed (respiratory depression or possible OD)  -     Suboxone  -     Toxicology screen, urine  -     buprenorphine-naloxone (Suboxone) 8-2 mg; One or two strips sl q day  Medication therapy continued  -     naloxone (Narcan) 4 mg/0 1 mL nasal spray; 0 1 mL (4 mg total) into each nostril as needed (respiratory depression or possible OD)  -     Suboxone  -     Toxicology screen, urine  -     buprenorphine-naloxone (Suboxone) 8-2 mg; One or two strips sl q day  Alcohol abuse        No problem-specific Assessment & Plan notes found for this encounter  A/P: Doing ok and tolerating the med  UDT obtained  IN counseling  Recommend NA and AA  Will continue 16mg films, dose 1/6  Narcan sent in  Pt to keep meds safe and out of reach of children  RTC one week for f/u  Subjective:      Patient ID: Ishan Morse is a 31 y o  female  WF, w/o a PCP, presents to transfer her suboxone treatment from [facility]  Pt reports orally abusing prescription narcs at age 12 and by 24, was nasally abusing heroin  Reached a max of 2-3 bundles per day  At age 24, went in to rehab and has been clean since over the past three years  However, start heavily using ETOH around age 31 and max of two pints whiskey/day  Recently completed rehab at [facility] and d/c'd on 16 mg suboxone  On probation/drug court currently  No OD's per pt  Multiple failed detoxs  Current going to counseling at [facility] and [facility]  Last used three years ago and ETOH one month ago  No NA or AA  The following portions of the patient's history were reviewed and updated as appropriate:   She has a past medical history of Addiction to drug ([address] ) and Anxiety  ,  does not have any pertinent problems on file  ,   has no past surgical history on file  ,  family history is not on file  ,   reports that she has been smoking  She has been smoking about 0 50 packs per day  She has never used smokeless tobacco  She reports previous alcohol use  She reports that she does not use drugs  ,  has No Known Allergies     Current Outpatient Medications   Medication Sig Dispense Refill    buprenorphine-naloxone (Suboxone) 8-2 mg One or two strips sl q day   14 Film 0    gabapentin (NEURONTIN) 300 mg capsule Take 100 mg by mouth 3 (three) times a day      naloxone (Narcan) 4 mg/0 1 mL nasal spray 0 1 mL (4 mg total) into each nostril as needed (respiratory depression or possible OD) 1 each 1    ondansetron (ZOFRAN-ODT) 4 mg disintegrating tablet Take 1 tablet (4 mg total) by mouth every 6 (six) hours as needed for nausea or vomiting 12 tablet 0    QUEtiapine (SEROquel) 200 mg tablet Take 25 mg by mouth daily at bedtime       No current facility-administered medications for this visit  Review of Systems   Constitutional: Negative for activity change, chills, diaphoresis, fatigue and fever  HENT: Negative  Eyes: Negative for visual disturbance  Respiratory: Negative for cough, chest tightness, shortness of breath and wheezing  Cardiovascular: Negative for chest pain, palpitations and leg swelling  Gastrointestinal: Negative for abdominal pain, constipation, diarrhea, nausea and vomiting  Endocrine: Negative for cold intolerance and heat intolerance  Genitourinary: Negative for difficulty urinating, dysuria and frequency  Musculoskeletal: Negative for arthralgias, gait problem and myalgias  Neurological: Negative for dizziness, seizures, syncope, weakness, light-headedness and headaches  Psychiatric/Behavioral: Negative for confusion, dysphoric mood and sleep disturbance  The patient is not nervous/anxious  PHQ-2/9 Depression Screening          Objective:  Vitals:    04/13/22 1242   BP: 108/64   Pulse: 76   Temp: (!) 97 2 °F (36 2 °C)   SpO2: 98%   Weight: 52 4 kg (115 lb 8 oz)   Height: 5' 4" (1 626 m)     Body mass index is 19 83 kg/m²  Physical Exam  Vitals and nursing note reviewed  Constitutional:       General: She is not in acute distress  Appearance: Normal appearance  She is not ill-appearing  HENT:      Head: Normocephalic and atraumatic  Mouth/Throat:      Mouth: Mucous membranes are moist    Eyes:      Extraocular Movements: Extraocular movements intact  Conjunctiva/sclera: Conjunctivae normal       Pupils: Pupils are equal, round, and reactive to light  Neck:      Vascular: No carotid bruit  Cardiovascular:      Rate and Rhythm: Normal rate and regular rhythm  Heart sounds: Normal heart sounds  Pulmonary:      Effort: Pulmonary effort is normal  No respiratory distress  Breath sounds: Normal breath sounds   No wheezing or rales    Abdominal:      General: Bowel sounds are normal  There is no distension  Palpations: Abdomen is soft  Tenderness: There is no abdominal tenderness  Musculoskeletal:      Cervical back: Neck supple  Right lower leg: No edema  Left lower leg: No edema  Neurological:      General: No focal deficit present  Mental Status: She is alert and oriented to person, place, and time  Mental status is at baseline  Psychiatric:         Mood and Affect: Mood normal          Behavior: Behavior normal          Thought Content:  Thought content normal          Judgment: Judgment normal

## 2022-04-14 LAB
AMPHETAMINES UR QL SCN: NEGATIVE NG/ML
BARBITURATES UR QL SCN: NEGATIVE NG/ML
BENZODIAZ UR QL: NEGATIVE NG/ML
BZE UR QL: NEGATIVE NG/ML
CANNABINOIDS UR QL SCN: NEGATIVE NG/ML
METHADONE UR QL SCN: NEGATIVE NG/ML
OPIATES UR QL: NEGATIVE NG/ML
PCP UR QL: NEGATIVE NG/ML
PROPOXYPH UR QL SCN: NEGATIVE NG/ML

## 2022-04-25 LAB
BUPRENORPHINE UR CFM-MCNC: 416 NG/ML
BUPRENORPHINE UR QL CFM: NORMAL NG/ML
BUPRENORPHINE UR QL CFM: POSITIVE
BUPRENORPHINE+NOR UR QL: POSITIVE
NORBUPRENORPHINE UR CFM-MCNC: 842 NG/ML
NORBUPRENORPHINE UR QL CFM: POSITIVE

## 2022-06-09 ENCOUNTER — OFFICE VISIT (OUTPATIENT)
Dept: DENTISTRY | Facility: CLINIC | Age: 27
End: 2022-06-09

## 2022-06-09 VITALS — TEMPERATURE: 97.5 F

## 2022-06-09 DIAGNOSIS — Z01.20 ENCOUNTER FOR DENTAL EXAMINATION: Primary | ICD-10-CM

## 2022-06-09 PROCEDURE — D0150 COMPREHENSIVE ORAL EVALUATION - NEW OR ESTABLISHED PATIENT: HCPCS | Performed by: DENTIST

## 2022-06-09 PROCEDURE — D0210 INTRAORAL - COMPLETE SERIES OF RADIOGRAPHIC IMAGES: HCPCS

## 2022-06-09 NOTE — PROGRESS NOTES
Reason for visit:Comprehensive Exam  Rooming Includes:  Dental Vitals recorded  Allergies Reviewed  Medication Reviewed  Dental Health Compliance: Twice daily brushing, never flossing, use of fluoride toothpaste  Medical History Reviewed  ASA 2 - Patient with mild systemic disease with no functional limitations  Patient has no complaints/no pain  Patient presents for exam appointment  Frankl +   Treatment provided includes comprehensive exam performed by Dr Oumar Coreas and full mouth series of xrays to evaluate overall health and decay  Intraoral exam/Oral Cancer Screening presents with no significant findings  Full mouth perio charting recorded  Next visit:prophy, restorative and referral to general dentist  *Triplicate form indicated today's procedures and future visits needed  First page is on file in media center and 3rd page was sent home with patient for parents to review

## 2022-06-14 ENCOUNTER — OFFICE VISIT (OUTPATIENT)
Dept: DENTISTRY | Facility: CLINIC | Age: 27
End: 2022-06-14

## 2022-06-14 VITALS — TEMPERATURE: 97.8 F

## 2022-06-14 DIAGNOSIS — Z29.8 ENCOUNTER FOR OTHER SPECIFIED PROPHYLACTIC MEASURES: Primary | ICD-10-CM

## 2022-06-14 PROCEDURE — D1110 PROPHYLAXIS - ADULT: HCPCS

## 2022-06-14 PROCEDURE — D1330 ORAL HYGIENE INSTRUCTIONS: HCPCS

## 2022-06-14 NOTE — PATIENT INSTRUCTIONS
Oral hygiene instructions include brushing 2x daily and flossing daily  Reviewed brushing along gumline  Recommend sensodyne application at gumline in sensitive spots, especially tooth #24

## 2022-06-14 NOTE — PROGRESS NOTES
Reason for visit:Routine Prophylaxis  Rooming Includes:  Dental Vitals recorded  Allergies Reviewed  Medication Reviewed  Dental Health Compliance: Twice daily brushing, never flossing, use of fluoride toothpaste  Medical History Reviewed  ASA 2 - Patient with mild systemic disease with no functional limitations    Patient has no complaints/no pain  Patient presents for hygiene appointment  Frankl +   Treatment provided includesadult prophy, handscale, polish(mint paste), floss,oral hygiene instructions  Intraoral exam/Oral Cancer Screening presents with no significant findings  Plaque buildup is generalized Moderate  Calculus buildup is Generalized  Light  Gingival evaluation is pink  Stain evaluation is no stain present  Oral hygiene instructions include brushing 2x daily and flossing daily  Next visit: restorative, referral to general dentist and 6 month recall  *Triplicate form indicated today's procedures and future visits needed  First page is on file in media center and 3rd page was sent home with patient

## 2022-06-15 ENCOUNTER — HOSPITAL ENCOUNTER (EMERGENCY)
Facility: HOSPITAL | Age: 27
Discharge: HOME | End: 2022-06-15
Attending: EMERGENCY MEDICINE | Admitting: EMERGENCY MEDICINE
Payer: COMMERCIAL

## 2022-06-15 VITALS
HEART RATE: 80 BPM | OXYGEN SATURATION: 99 % | RESPIRATION RATE: 16 BRPM | SYSTOLIC BLOOD PRESSURE: 155 MMHG | DIASTOLIC BLOOD PRESSURE: 71 MMHG | TEMPERATURE: 98.7 F
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
## 2022-06-15 PROCEDURE — 99283 EMERGENCY DEPT VISIT LOW MDM: CPT

## 2022-06-15 PROCEDURE — 99284 EMERGENCY DEPT VISIT MOD MDM: CPT | Performed by: PHYSICIAN ASSISTANT

## 2022-06-15 RX ORDER — HYDROCORTISONE 25 MG/ML
LOTION TOPICAL 2 TIMES DAILY
Qty: 50 ML | Refills: 0 | Status: SHIPPED | OUTPATIENT
Start: 2022-06-15

## 2022-06-15 NOTE — DISCHARGE INSTRUCTIONS
Apply benadryl and hydrocortisone cream as needed for itching  Return to the ER with any fevers or other significant change or worsening of your symptoms

## 2022-06-15 NOTE — ED PROVIDER NOTES
History  Chief Complaint   Patient presents with    Foot Swelling     Pt reports she believes she may have multiple insect bites to the left foot sustained yesterday while outside     54-year-old female history of anxiety and drug addiction presents complaining of a but bite to her left foot  Patient reports that she was in [employer] doing community service when she believes something bit her  She reports that it is very itchy and that she has been applying topical Benadryl  She states that she was told by a person managing her community service that she must have a prescription for any medication that she is taking due to frequent drug test   She denies any fevers, chills or any pus-like discharge from the affected area  Denies any other complaints or concerns at this time  Prior to Admission Medications   Prescriptions Last Dose Informant Patient Reported? Taking? QUEtiapine (SEROquel) 200 mg tablet   Yes No   Sig: Take 25 mg by mouth daily at bedtime   buprenorphine-naloxone (Suboxone) 8-2 mg   No No   Sig: One or two strips sl q day    gabapentin (NEURONTIN) 300 mg capsule   Yes No   Sig: Take 100 mg by mouth 3 (three) times a day   naloxone (Narcan) 4 mg/0 1 mL nasal spray   No No   Si 1 mL (4 mg total) into each nostril as needed (respiratory depression or possible OD)   ondansetron (ZOFRAN-ODT) 4 mg disintegrating tablet   No No   Sig: Take 1 tablet (4 mg total) by mouth every 6 (six) hours as needed for nausea or vomiting      Facility-Administered Medications: None       Past Medical History:   Diagnosis Date    [facility]     Anxiety        History reviewed  No pertinent surgical history  History reviewed  No pertinent family history  I have reviewed and agree with the history as documented      E-Cigarette/Vaping     E-Cigarette/Vaping Substances     Social History     Tobacco Use    Smoking status: Current Every Day Smoker     Packs/day: 0 50     Years: 10 00 Pack years: 5 00    Smokeless tobacco: Never Used   Substance Use Topics    Alcohol use: Not Currently     Comment: quit one month ago and was heavey user   Drug use: Not Currently     Types: Heroin, Oxycodone, Marijuana       Review of Systems   Constitutional: Negative for chills, fatigue and fever  HENT: Negative for ear pain and sore throat  Eyes: Negative for pain  Respiratory: Negative for cough, shortness of breath and wheezing  Cardiovascular: Negative for chest pain, palpitations and leg swelling  Gastrointestinal: Negative for abdominal pain, constipation, diarrhea, nausea and vomiting  Endocrine: Negative for polyuria  Genitourinary: Negative for dysuria and pelvic pain  Musculoskeletal: Negative for arthralgias, myalgias, neck pain and neck stiffness  Skin: Positive for color change  Negative for rash  Neurological: Negative for dizziness, syncope, light-headedness and headaches  All other systems reviewed and are negative  Physical Exam  Physical Exam  Constitutional:       General: She is not in acute distress  Appearance: She is well-developed  She is not ill-appearing or diaphoretic  HENT:      Head: Normocephalic and atraumatic  Right Ear: External ear normal       Left Ear: External ear normal       Nose: Nose normal       Mouth/Throat:      Mouth: Mucous membranes are moist    Eyes:      Extraocular Movements: Extraocular movements intact  Conjunctiva/sclera: Conjunctivae normal       Pupils: Pupils are equal, round, and reactive to light  Pulmonary:      Effort: Pulmonary effort is normal  No respiratory distress  Musculoskeletal:         General: No deformity  Normal range of motion  Cervical back: Normal range of motion  Skin:     General: Skin is warm  Capillary Refill: Capillary refill takes less than 2 seconds  Findings: Erythema present  No rash        Comments: Medial surface of left midfoot mildly erythematous, blanchable  Not significantly tender to the touch  No palpable fluctuance or abscess appreciated  Neurological:      General: No focal deficit present  Mental Status: She is alert and oriented to person, place, and time  Psychiatric:         Mood and Affect: Mood normal          Behavior: Behavior normal          Vital Signs  ED Triage Vitals [06/15/22 1434]   Temperature Pulse Respirations Blood Pressure SpO2   98 7 °F (37 1 °C) 80 16 155/71 99 %      Temp Source Heart Rate Source Patient Position - Orthostatic VS BP Location FiO2 (%)   Temporal Monitor -- Right arm --      Pain Score       --           Vitals:    06/15/22 1434   BP: 155/71   Pulse: 80         Visual Acuity      ED Medications  Medications - No data to display    Diagnostic Studies  Results Reviewed     None                 No orders to display              Procedures  Procedures         ED Course                               SBIRT 22yo+    Flowsheet Row Most Recent Value   SBIRT (25 yo +)    In order to provide better care to our patients, we are screening all of our patients for alcohol and drug use  Would it be okay to ask you these screening questions? Yes Filed at: 06/15/2022 1452   Initial Alcohol Screen: US AUDIT-C     1  How often do you have a drink containing alcohol? 0 Filed at: 06/15/2022 1452   2  How many drinks containing alcohol do you have on a typical day you are drinking? 0 Filed at: 06/15/2022 1452   3b  FEMALE Any Age, or MALE 65+: How often do you have 4 or more drinks on one occassion? 0 Filed at: 06/15/2022 1452   Audit-C Score 0 Filed at: 06/15/2022 1452   PIERRE: How many times in the past year have you    Used an illegal drug or used a prescription medication for non-medical reasons? Never Filed at: 06/15/2022 1452                    MDM  Number of Diagnoses or Management Options  Bug bite  Diagnosis management comments: Patient presented with likely bug bite on foot  Doubt cellulitis or infection    Area is pruritic  No petechiae  No bullae  No skin sloughing  Negative Nikolsky sign  Will prescribe topical hydrocortisone and topical diphenhydramine  Recommend PCP follow-up  Ambulatory referral provided return precautions to the ED provided  All questions were answered and she was agreeable to plan  Disposition  Final diagnoses:   Bug bite     Time reflects when diagnosis was documented in both MDM as applicable and the Disposition within this note     Time User Action Codes Description Comment    6/15/2022  2:51 PM William Lime  XXXA] Bug bite     6/15/2022  2:52 PM [employer] [F41 9] Anxiety     6/15/2022  2:53 PM Livan Wayne [F41 9] Anxiety       ED Disposition     ED Disposition   Discharge    Condition   Stable    Date/Time   Wed Juin 15, 2022  2:50 PM    Comment   Amanda Lopez discharge to home/self care                 Follow-up Information    None         Discharge Medication List as of 6/15/2022  2:56 PM      START taking these medications    Details   diphenhydrAMINE (BENADRYL) 2 % cream Apply topically 3 (three) times a day as needed for itching, Starting Wed 6/15/2022, Normal      hydrocortisone 2 5 % lotion Apply topically 2 (two) times a day, Starting Wed 6/15/2022, Normal         CONTINUE these medications which have NOT CHANGED    Details   buprenorphine-naloxone (Suboxone) 8-2 mg One or two strips sl q day , Normal      gabapentin (NEURONTIN) 300 mg capsule Take 100 mg by mouth 3 (three) times a day, Historical Med      naloxone (Narcan) 4 mg/0 1 mL nasal spray 0 1 mL (4 mg total) into each nostril as needed (respiratory depression or possible OD), Starting Wed 4/13/2022, Normal      ondansetron (ZOFRAN-ODT) 4 mg disintegrating tablet Take 1 tablet (4 mg total) by mouth every 6 (six) hours as needed for nausea or vomiting, Starting Tue 11/6/2018, Normal      QUEtiapine (SEROquel) 200 mg tablet Take 25 mg by mouth daily at bedtime, Historical Med PDMP Review       Value Time User    PDMP Reviewed  Yes 4/13/2022 12:39 PM Katlyn Small DO          ED Provider  Electronically Signed by           Janessa Peralta PA-C  06/15/22 4764

## 2022-06-30 ENCOUNTER — OFFICE VISIT (OUTPATIENT)
Dept: DENTISTRY | Facility: CLINIC | Age: 27
End: 2022-06-30

## 2022-06-30 VITALS — DIASTOLIC BLOOD PRESSURE: 78 MMHG | TEMPERATURE: 98.7 F | SYSTOLIC BLOOD PRESSURE: 122 MMHG

## 2022-06-30 DIAGNOSIS — K02.9 DENTAL CARIES: Primary | ICD-10-CM

## 2022-06-30 PROCEDURE — D2332 RESIN-BASED COMPOSITE - 3 SURFACES, ANTERIOR: HCPCS | Performed by: DENTIST

## 2022-06-30 PROCEDURE — D2330 RESIN-BASED COMPOSITE - 1 SURFACE, ANTERIOR: HCPCS | Performed by: DENTIST

## 2022-06-30 NOTE — PROGRESS NOTES
MUD consent form verified  No changes to medical history per MUD form  Pt is ASA 1 Patient reports pain level of 0  Patient denies any constitutional symptoms  Patient presents for restorative treatment # 7-MFL,8-M, 9-I, 10-MFL EOE WNL  IOE shows no swelling or sinus tracts  Anesthesia: 20% benzocaine topical + 2 0 carpule of 4% articaine with 1:100k epi via buccal infiltration  Isolation: Cotton rolls  Tx:  Caries excavation of tooth 7,8,9,10 Etch for 12 seconds with 37% phosphoric acid and rinsed, Ivoclar [employer] bond placed with [employer] 20 second scrub, air dried for 5 seconds and light cured and restored with Tetric composite  Placed sealant over remaining grooves  Polished restoration  Verified contacts and occlusion  Patient satisfied and dismissed alert and ambulatory    Reviewed post-op anesthesia precautions with patient     NV: Restorative at clinic  #12,14 with deep caries  #4,5 extractions

## 2022-07-16 ENCOUNTER — TELEPHONE (OUTPATIENT)
Dept: OTHER | Facility: OTHER | Age: 27
End: 2022-07-16

## 2022-07-16 NOTE — TELEPHONE ENCOUNTER
[facility] and has a [facility] and also has two teeth that need to be pulled   Please call back to schedule